# Patient Record
Sex: MALE | URBAN - METROPOLITAN AREA
[De-identification: names, ages, dates, MRNs, and addresses within clinical notes are randomized per-mention and may not be internally consistent; named-entity substitution may affect disease eponyms.]

---

## 2021-04-02 ENCOUNTER — APPOINTMENT (OUTPATIENT)
Dept: URBAN - METROPOLITAN AREA CLINIC 273 | Age: 36
Setting detail: DERMATOLOGY
End: 2021-04-05

## 2021-04-02 DIAGNOSIS — Z41.9 ENCOUNTER FOR PROCEDURE FOR PURPOSES OTHER THAN REMEDYING HEALTH STATE, UNSPECIFIED: ICD-10-CM

## 2021-04-02 PROCEDURE — OTHER FACIAL: OTHER

## 2021-04-02 ASSESSMENT — LOCATION SIMPLE DESCRIPTION DERM: LOCATION SIMPLE: RIGHT CHEEK

## 2021-04-02 ASSESSMENT — LOCATION DETAILED DESCRIPTION DERM: LOCATION DETAILED: RIGHT CENTRAL MALAR CHEEK

## 2021-04-02 ASSESSMENT — LOCATION ZONE DERM: LOCATION ZONE: FACE

## 2021-04-02 NOTE — PROCEDURE: FACIAL
Exfoliation Type Override: dermaplane
Detail Level: Zone
Extraction Method: extractor
Exfoliation Type: scrub
Comments (Sticky): Cleanse,exfoliate, steam, extractions, mask (all ZO) \\n\\nDoesn’t do well with fragrance. ZO is okay though.
Treatment Type (Optional): Deep Cleanse Treatment
Facial Steaming: steamed
Price (Use Numbers Only, No Special Characters Or $): 40